# Patient Record
Sex: FEMALE | Race: WHITE | NOT HISPANIC OR LATINO | Employment: FULL TIME | URBAN - METROPOLITAN AREA
[De-identification: names, ages, dates, MRNs, and addresses within clinical notes are randomized per-mention and may not be internally consistent; named-entity substitution may affect disease eponyms.]

---

## 2023-04-03 DIAGNOSIS — Z78.9 USES BIRTH CONTROL: ICD-10-CM

## 2023-04-03 RX ORDER — NORETHINDRONE AND ETHINYL ESTRADIOL 1 MG-35MCG
1 KIT ORAL DAILY
COMMUNITY
Start: 2019-02-03 | End: 2023-04-03 | Stop reason: SDUPTHER

## 2023-04-03 RX ORDER — NORETHINDRONE AND ETHINYL ESTRADIOL 1 MG-35MCG
1 KIT ORAL DAILY
Qty: 28 TABLET | Refills: 4 | Status: SHIPPED | OUTPATIENT
Start: 2023-04-03 | End: 2023-08-07 | Stop reason: SDUPTHER

## 2023-04-18 LAB
RBC, URINE: 1 /HPF (ref 0–5)
SQUAMOUS EPITHELIAL CELLS, URINE: <1 /HPF
WBC, URINE: NORMAL /HPF (ref 0–5)

## 2023-04-19 LAB
CLUE CELLS: NORMAL
NUGENT SCORE: 2
URINE CULTURE: NO GROWTH
YEAST: NORMAL

## 2023-05-03 LAB
BACTERIA, URINE: ABNORMAL /HPF
RBC, URINE: 4 /HPF (ref 0–5)
SQUAMOUS EPITHELIAL CELLS, URINE: 2 /HPF
WBC, URINE: 4 /HPF (ref 0–5)

## 2023-05-04 LAB — URINE CULTURE: NORMAL

## 2023-05-16 ENCOUNTER — OFFICE VISIT (OUTPATIENT)
Dept: PRIMARY CARE | Facility: CLINIC | Age: 28
End: 2023-05-16
Payer: COMMERCIAL

## 2023-05-16 VITALS
SYSTOLIC BLOOD PRESSURE: 100 MMHG | WEIGHT: 155 LBS | OXYGEN SATURATION: 100 % | HEART RATE: 43 BPM | HEIGHT: 62 IN | BODY MASS INDEX: 28.52 KG/M2 | DIASTOLIC BLOOD PRESSURE: 70 MMHG

## 2023-05-16 DIAGNOSIS — N39.0 FREQUENT UTI: ICD-10-CM

## 2023-05-16 DIAGNOSIS — Z00.00 ANNUAL PHYSICAL EXAM: Primary | ICD-10-CM

## 2023-05-16 LAB
ERYTHROCYTE DISTRIBUTION WIDTH (RATIO) BY AUTOMATED COUNT: 13.2 % (ref 11.5–14.5)
ERYTHROCYTE MEAN CORPUSCULAR HEMOGLOBIN CONCENTRATION (G/DL) BY AUTOMATED: 31.1 G/DL (ref 32–36)
ERYTHROCYTE MEAN CORPUSCULAR VOLUME (FL) BY AUTOMATED COUNT: 92 FL (ref 80–100)
ERYTHROCYTES (10*6/UL) IN BLOOD BY AUTOMATED COUNT: 4.88 X10E12/L (ref 4–5.2)
HEMATOCRIT (%) IN BLOOD BY AUTOMATED COUNT: 44.7 % (ref 36–46)
HEMOGLOBIN (G/DL) IN BLOOD: 13.9 G/DL (ref 12–16)
LEUKOCYTES (10*3/UL) IN BLOOD BY AUTOMATED COUNT: 6.2 X10E9/L (ref 4.4–11.3)
NRBC (PER 100 WBCS) BY AUTOMATED COUNT: 0 /100 WBC (ref 0–0)
PLATELETS (10*3/UL) IN BLOOD AUTOMATED COUNT: 437 X10E9/L (ref 150–450)
THYROTROPIN (MIU/L) IN SER/PLAS BY DETECTION LIMIT <= 0.05 MIU/L: 1.81 MIU/L (ref 0.44–3.98)

## 2023-05-16 PROCEDURE — 80053 COMPREHEN METABOLIC PANEL: CPT

## 2023-05-16 PROCEDURE — 99395 PREV VISIT EST AGE 18-39: CPT | Performed by: STUDENT IN AN ORGANIZED HEALTH CARE EDUCATION/TRAINING PROGRAM

## 2023-05-16 PROCEDURE — 36415 COLL VENOUS BLD VENIPUNCTURE: CPT

## 2023-05-16 PROCEDURE — 85027 COMPLETE CBC AUTOMATED: CPT

## 2023-05-16 PROCEDURE — 84443 ASSAY THYROID STIM HORMONE: CPT

## 2023-05-16 PROCEDURE — 1036F TOBACCO NON-USER: CPT | Performed by: STUDENT IN AN ORGANIZED HEALTH CARE EDUCATION/TRAINING PROGRAM

## 2023-05-16 RX ORDER — ASCORBIC ACID 125 MG
TABLET,CHEWABLE ORAL
COMMUNITY
Start: 2023-04-18

## 2023-05-16 RX ORDER — D-MANNOSE 99 %
POWDER (GRAM) MISCELLANEOUS
COMMUNITY
Start: 2023-04-18

## 2023-05-16 ASSESSMENT — ENCOUNTER SYMPTOMS
ARTHRALGIAS: 0
GASTROINTESTINAL NEGATIVE: 1
DYSPHORIC MOOD: 0
SINUS PRESSURE: 0
HEMATOLOGIC/LYMPHATIC NEGATIVE: 1
CARDIOVASCULAR NEGATIVE: 1
FATIGUE: 0
CONSTITUTIONAL NEGATIVE: 1
PALPITATIONS: 0
HEADACHES: 0
CHEST TIGHTNESS: 0
UNEXPECTED WEIGHT CHANGE: 0
MUSCULOSKELETAL NEGATIVE: 1
DIZZINESS: 0
SHORTNESS OF BREATH: 0
DIARRHEA: 0
WHEEZING: 0
NERVOUS/ANXIOUS: 0
NEUROLOGICAL NEGATIVE: 1
ABDOMINAL PAIN: 0
DIFFICULTY URINATING: 0
EYES NEGATIVE: 1
SLEEP DISTURBANCE: 0
RESPIRATORY NEGATIVE: 1
CONSTIPATION: 0
LIGHT-HEADEDNESS: 0

## 2023-05-16 ASSESSMENT — PATIENT HEALTH QUESTIONNAIRE - PHQ9
1. LITTLE INTEREST OR PLEASURE IN DOING THINGS: NOT AT ALL
SUM OF ALL RESPONSES TO PHQ9 QUESTIONS 1 AND 2: 0
2. FEELING DOWN, DEPRESSED OR HOPELESS: NOT AT ALL

## 2023-05-16 NOTE — PROGRESS NOTES
"Subjective   Patient ID: Delores Marshall is a 27 y.o. female who presents for Establish Care (New patient visit est care/).  Here to establish care as a new patient. Has not seen a PCP in several years. Has not had blood work in some time and is requesting to have blood work done during this visit. She has seen a gynecologist a few years ago, is up to date on pap smears. She does suffer from frequent UTI's, about 5-7/year, last one being 1 month ago. She routinely experiences dysuria, frequency, increased pelvic pressure, but no flank pain. She does follow up with a urologist that is currently working up her hx of frequent UTI's. She does have abx prescribed by the urologist as needed.    She denies any major surgeries.  She denies any significant medical hx in her family.    She is physically active, cleans homes for a living and has no issues with her physical activities.        Review of Systems   Constitutional: Negative.    HENT: Negative.     Eyes: Negative.    Respiratory: Negative.     Cardiovascular: Negative.    Gastrointestinal: Negative.    Genitourinary: Negative.    Musculoskeletal: Negative.    Skin: Negative.    Neurological: Negative.    Hematological: Negative.        Objective   /70 (BP Location: Right arm, Patient Position: Sitting, BP Cuff Size: Adult)   Pulse (!) 43   Ht 1.575 m (5' 2\")   Wt 70.3 kg (155 lb)   SpO2 100%   BMI 28.35 kg/m²   Lab Results   Component Value Date    WBC 10.7 10/15/2019    HGB 13.4 10/15/2019    HCT 41.1 10/15/2019    MCV 89 10/15/2019     10/15/2019     Lab Results   Component Value Date    GLUCOSE 68 (L) 10/15/2019    CALCIUM 9.8 10/15/2019     10/15/2019    K 3.8 10/15/2019    CO2 25 10/15/2019     10/15/2019    BUN 10 10/15/2019    CREATININE 0.63 10/15/2019     Lab Results   Component Value Date    TSH 1.30 01/08/2018     No results found for: HGBA1C    Physical Exam  Constitutional:       General: She is not in acute distress.     " Appearance: Normal appearance. She is normal weight. She is not ill-appearing.   HENT:      Head: Normocephalic and atraumatic.   Eyes:      Extraocular Movements: Extraocular movements intact.      Pupils: Pupils are equal, round, and reactive to light.   Cardiovascular:      Rate and Rhythm: Normal rate and regular rhythm.      Pulses: Normal pulses.      Heart sounds: Normal heart sounds. No murmur heard.  Pulmonary:      Effort: Pulmonary effort is normal. No respiratory distress.      Breath sounds: Normal breath sounds. No wheezing.   Abdominal:      General: Bowel sounds are normal. There is no distension.      Palpations: Abdomen is soft.      Tenderness: There is no abdominal tenderness.   Musculoskeletal:      Cervical back: Normal range of motion.   Skin:     General: Skin is warm and dry.   Neurological:      Mental Status: She is alert.         Assessment/Plan   Problem List Items Addressed This Visit    None  Visit Diagnoses       Annual physical exam    -  Primary    Relevant Orders    CBC    Comprehensive metabolic panel    TSH with reflex to Free T4 if abnormal              Patient seen and examined by resident physician,  - Dr. Yair Paulson PGY-3

## 2023-05-16 NOTE — PROGRESS NOTES
Subjective   Patient ID: Delores Marshall is a 27 y.o. female who presents for Establish Care (New patient visit [facility]/).  Establish care. Hx of frequent UTIs. Sees urology. Has had cystoscopy. Taking d mannose, seems to be helping.     Would like screening labs.     Up to date on paps. Up to date on HPV vaccines.     No concerns today.             Review of Systems   Constitutional:  Negative for fatigue and unexpected weight change.   HENT:  Negative for congestion, ear pain and sinus pressure.    Eyes:  Negative for visual disturbance.   Respiratory:  Negative for chest tightness, shortness of breath and wheezing.    Cardiovascular:  Negative for chest pain, palpitations and leg swelling.   Gastrointestinal:  Negative for abdominal pain, constipation and diarrhea.   Genitourinary:  Negative for difficulty urinating and menstrual problem.   Musculoskeletal:  Negative for arthralgias.   Skin:  Negative for rash.   Neurological:  Negative for dizziness, light-headedness and headaches.   Psychiatric/Behavioral:  Negative for dysphoric mood and sleep disturbance. The patient is not nervous/anxious.    All other systems reviewed and are negative.      Objective   Physical Exam  Vitals reviewed.   Constitutional:       General: She is not in acute distress.     Appearance: Normal appearance. She is not toxic-appearing.   HENT:      Head: Normocephalic.      Right Ear: Tympanic membrane, ear canal and external ear normal. There is no impacted cerumen.      Left Ear: Tympanic membrane, ear canal and external ear normal. There is no impacted cerumen.      Nose: Nose normal. No congestion or rhinorrhea.      Mouth/Throat:      Mouth: Mucous membranes are moist.      Pharynx: Oropharynx is clear. No oropharyngeal exudate.   Eyes:      Pupils: Pupils are equal, round, and reactive to light.   Cardiovascular:      Rate and Rhythm: Normal rate and regular rhythm.   Pulmonary:      Effort: Pulmonary effort is normal.       Breath sounds: Normal breath sounds.   Musculoskeletal:         General: Normal range of motion.      Cervical back: Normal range of motion.   Skin:     General: Skin is warm and dry.   Neurological:      General: No focal deficit present.      Mental Status: She is alert. Mental status is at baseline.   Psychiatric:         Mood and Affect: Mood normal.         Behavior: Behavior normal.       Current Outpatient Medications:     Alyacen 1/35, 28, 1-35 mg-mcg tablet, Take 1 tablet by mouth once daily., Disp: 28 tablet, Rfl: 4    ascorbic acid (Vitamin C) 125 mg chewable tablet, Chew., Disp: , Rfl:     d-mannose, bulk, 99 % powder, D-Mannose Powder Refills: 0  Start: 18-Apr-2023, Disp: , Rfl:       Assessment/Plan   Diagnoses and all orders for this visit:  Annual physical exam  Comments:  up to date on pap  up to date on vaccines  Orders:  -     CBC  -     Comprehensive metabolic panel  -     TSH with reflex to Free T4 if abnormal  Frequent UTI  Comments:  taking d mannose  follows with urology    I saw and evaluated the patient. I personally obtained the key and critical portions of the history and physical exam or was physically present for key and critical portions performed by the trainee. I reviewed the trainee's documentation and discussed the patient with the trainee. I agree with the trainee's medical decision making, as documented on the trainee's note.      Alma Rosa Blum, DO

## 2023-05-17 LAB
ALANINE AMINOTRANSFERASE (SGPT) (U/L) IN SER/PLAS: 12 U/L (ref 7–45)
ALBUMIN (G/DL) IN SER/PLAS: 4.6 G/DL (ref 3.4–5)
ALKALINE PHOSPHATASE (U/L) IN SER/PLAS: 46 U/L (ref 33–110)
ANION GAP IN SER/PLAS: 15 MMOL/L (ref 10–20)
ASPARTATE AMINOTRANSFERASE (SGOT) (U/L) IN SER/PLAS: 13 U/L (ref 9–39)
BILIRUBIN TOTAL (MG/DL) IN SER/PLAS: 0.5 MG/DL (ref 0–1.2)
CALCIUM (MG/DL) IN SER/PLAS: 10 MG/DL (ref 8.6–10.6)
CARBON DIOXIDE, TOTAL (MMOL/L) IN SER/PLAS: 29 MMOL/L (ref 21–32)
CHLORIDE (MMOL/L) IN SER/PLAS: 101 MMOL/L (ref 98–107)
CREATININE (MG/DL) IN SER/PLAS: 0.7 MG/DL (ref 0.5–1.05)
GFR FEMALE: >90 ML/MIN/1.73M2
GLUCOSE (MG/DL) IN SER/PLAS: 85 MG/DL (ref 74–99)
POTASSIUM (MMOL/L) IN SER/PLAS: 5 MMOL/L (ref 3.5–5.3)
PROTEIN TOTAL: 7.3 G/DL (ref 6.4–8.2)
SODIUM (MMOL/L) IN SER/PLAS: 140 MMOL/L (ref 136–145)
UREA NITROGEN (MG/DL) IN SER/PLAS: 10 MG/DL (ref 6–23)

## 2023-08-07 DIAGNOSIS — Z78.9 USES BIRTH CONTROL: ICD-10-CM

## 2023-08-07 RX ORDER — NORETHINDRONE AND ETHINYL ESTRADIOL 1 MG-35MCG
1 KIT ORAL DAILY
Qty: 28 TABLET | Refills: 4 | Status: SHIPPED | OUTPATIENT
Start: 2023-08-07 | End: 2023-11-17 | Stop reason: SDUPTHER

## 2023-11-17 DIAGNOSIS — Z78.9 USES BIRTH CONTROL: ICD-10-CM

## 2023-11-17 RX ORDER — NORETHINDRONE AND ETHINYL ESTRADIOL 1 MG-35MCG
1 KIT ORAL DAILY
Qty: 28 TABLET | Refills: 4 | Status: SHIPPED | OUTPATIENT
Start: 2023-11-17 | End: 2024-03-23

## 2024-01-19 ENCOUNTER — TELEMEDICINE (OUTPATIENT)
Dept: UROLOGY | Facility: CLINIC | Age: 29
End: 2024-01-19
Payer: COMMERCIAL

## 2024-01-19 DIAGNOSIS — N39.0 RECURRENT UTI: Primary | ICD-10-CM

## 2024-01-19 PROCEDURE — 99213 OFFICE O/P EST LOW 20 MIN: CPT | Performed by: NURSE PRACTITIONER

## 2024-01-19 NOTE — PROGRESS NOTES
01/19/24   [id number]    Follow up recurrent UTIs     Subjective      HPI Delores Marshall is a 28 y.o. female who presents for follow up recurrent UTIs.     Dmanose gummies, happy w abx suppression w intimacy some of the time, doesn't need all the time; doesn't need refill right now; No UTIs since last seen in the summer; overall, 80% better, and not a 100% since she is still taking the suppression therapy some of the time;       Objective     There were no vitals taken for this visit.   Physical Exam  General: Appears comfortable and in no apparent distress, well nourished  Head: Normocephalic, atraumatic  Neck: trachea midline  Respiratory: respirations unlabored, no wheezes, and no use of accessory muscles  Cardiovascular: at rest no dyspnea, well perfused  Skin: no visible rashes or lesions  Neurologic: grossly intact, oriented to person, place, and time  Psychiatric: mood and affect appropriate  Musculoskeletal: in chair for appt. no difficulty w upper body movement      Assessment/Plan   Problem List Items Addressed This Visit    None  Visit Diagnoses       Recurrent UTI    -  Primary          No orders of the defined types were placed in this encounter.       Plan:   continue Macrodantin 50 mg w intercourse PRN  Plans to wean off if she can, otherwise will call for refill when needed  f/u 6 mos     call Shyanne at [phone] sooner if any concerns on nonemergency nurse line       JOHANNA Mejia-CNP  Lab Results   Component Value Date    GLUCOSE 85 05/16/2023    CALCIUM 10.0 05/16/2023     05/16/2023    K 5.0 05/16/2023    CO2 29 05/16/2023     05/16/2023    BUN 10 05/16/2023    CREATININE 0.70 05/16/2023

## 2024-01-19 NOTE — PATIENT INSTRUCTIONS
Plan:   continue Macrodantin 50 mg w intercourse PRN  Plans to wean off if she can, otherwise will call for refill when needed  f/u 6 mos     call Shyanne at [phone] sooner if any concerns on nonemergency nurse line

## 2024-03-23 DIAGNOSIS — Z78.9 USES BIRTH CONTROL: ICD-10-CM

## 2024-03-23 RX ORDER — NORETHINDRONE AND ETHINYL ESTRADIOL 1 MG-35MCG
1 KIT ORAL DAILY
Qty: 28 TABLET | Refills: 4 | Status: SHIPPED | OUTPATIENT
Start: 2024-03-23 | End: 2024-04-10 | Stop reason: SDUPTHER

## 2024-04-10 ENCOUNTER — OFFICE VISIT (OUTPATIENT)
Dept: PRIMARY CARE | Facility: CLINIC | Age: 29
End: 2024-04-10
Payer: COMMERCIAL

## 2024-04-10 VITALS
SYSTOLIC BLOOD PRESSURE: 120 MMHG | HEART RATE: 88 BPM | BODY MASS INDEX: 29.63 KG/M2 | HEIGHT: 62 IN | WEIGHT: 161 LBS | OXYGEN SATURATION: 100 % | DIASTOLIC BLOOD PRESSURE: 80 MMHG

## 2024-04-10 DIAGNOSIS — Z78.9 USES BIRTH CONTROL: ICD-10-CM

## 2024-04-10 DIAGNOSIS — R51.9 NONINTRACTABLE EPISODIC HEADACHE, UNSPECIFIED HEADACHE TYPE: ICD-10-CM

## 2024-04-10 DIAGNOSIS — K21.9 GERD WITHOUT ESOPHAGITIS: Primary | ICD-10-CM

## 2024-04-10 PROCEDURE — 99214 OFFICE O/P EST MOD 30 MIN: CPT | Performed by: STUDENT IN AN ORGANIZED HEALTH CARE EDUCATION/TRAINING PROGRAM

## 2024-04-10 PROCEDURE — 1036F TOBACCO NON-USER: CPT | Performed by: STUDENT IN AN ORGANIZED HEALTH CARE EDUCATION/TRAINING PROGRAM

## 2024-04-10 RX ORDER — NORETHINDRONE AND ETHINYL ESTRADIOL 1 MG-35MCG
1 KIT ORAL DAILY
Qty: 28 TABLET | Refills: 12 | Status: SHIPPED | OUTPATIENT
Start: 2024-04-10

## 2024-04-10 RX ORDER — OMEPRAZOLE 40 MG/1
40 CAPSULE, DELAYED RELEASE ORAL
Qty: 30 CAPSULE | Refills: 0 | Status: SHIPPED | OUTPATIENT
Start: 2024-04-10 | End: 2024-06-06

## 2024-04-10 ASSESSMENT — ENCOUNTER SYMPTOMS
ABDOMINAL PAIN: 1
DIZZINESS: 0
ACTIVITY CHANGE: 0
DYSPHORIC MOOD: 0
SLEEP DISTURBANCE: 0
SHORTNESS OF BREATH: 0
CHEST TIGHTNESS: 0
FATIGUE: 0
VOMITING: 0
HEADACHES: 1
MUSCULOSKELETAL NEGATIVE: 1
NAUSEA: 0

## 2024-04-10 NOTE — PROGRESS NOTES
Subjective   Patient ID: Delores Marshall is a 28 y.o. female who presents for Headache (Headaches - was having them pretty frequently but they have subsided since she scheduled the apt . Takes tylenol for the pain. Would not classify as migraines. No light sensitivity or nausea etc. /Heartburn - takes otc alkasletzer /).  Headaches in front of forehead, pressure. Denies dizziness, photophobia, or phonophobia. These started about 4 weeks ago. Tylenol helped. Was getting them every day. Had a recent eye exam. Is wearing her glasses now, was not prior.     Since wearing her glasses she has been doing better.    No seasonal allergies or nasal congestion.     Drinks a lot of water.     Sleeping well.     Frequent heartburn. Happening right after eating. Last few months. Taking elva seltzer chewables. No nausea or vomiting.     Also says sometimes she will become fixated on her heart rate. Can last for up to a few minutes. Usually tries to walk around or distract herself.     No other concerns today.           Review of Systems   Constitutional:  Negative for activity change and fatigue.   HENT: Negative.     Respiratory:  Negative for chest tightness and shortness of breath.    Cardiovascular:  Negative for chest pain.   Gastrointestinal:  Positive for abdominal pain. Negative for nausea and vomiting.   Musculoskeletal: Negative.    Neurological:  Positive for headaches. Negative for dizziness.   Psychiatric/Behavioral:  Negative for dysphoric mood and sleep disturbance.    All other systems reviewed and are negative.      Objective   Physical Exam  Vitals reviewed.   Constitutional:       General: She is not in acute distress.     Appearance: Normal appearance.   HENT:      Head: Normocephalic.      Right Ear: There is no impacted cerumen.      Left Ear: There is no impacted cerumen.      Nose: Nose normal. No congestion.      Mouth/Throat:      Mouth: Mucous membranes are moist.   Eyes:      Pupils: Pupils are equal,  round, and reactive to light.   Pulmonary:      Effort: Pulmonary effort is normal. No respiratory distress.   Musculoskeletal:         General: Normal range of motion.   Skin:     General: Skin is warm and dry.   Neurological:      Mental Status: She is alert. Mental status is at baseline.   Psychiatric:         Mood and Affect: Mood normal.         Behavior: Behavior normal.         Body mass index is 29.45 kg/m².      Current Outpatient Medications:     ascorbic acid (Vitamin C) 125 mg chewable tablet, Chew., Disp: , Rfl:     d-mannose, bulk, 99 % powder, D-Mannose Powder Refills: 0  Start: 18-Apr-2023, Disp: , Rfl:     Alyacen 1/35, 28, 1-35 mg-mcg tablet, Take 1 tablet by mouth once daily., Disp: 28 tablet, Rfl: 12    omeprazole (PriLOSEC) 40 mg DR capsule, Take 1 capsule (40 mg) by mouth once daily in the morning. Take before meals. Do not crush or chew., Disp: 30 capsule, Rfl: 0      Assessment/Plan   Diagnoses and all orders for this visit:  GERD without esophagitis  Comments:  persistent symptoms  one month PPI trial  follow up for EGD if not improving  Orders:  -     omeprazole (PriLOSEC) 40 mg DR capsule; Take 1 capsule (40 mg) by mouth once daily in the morning. Take before meals. Do not crush or chew.  Uses birth control  -     Alyacen 1/35, 28, 1-35 mg-mcg tablet; Take 1 tablet by mouth once daily.  Nonintractable episodic headache, unspecified headache type  Comments:  now that she is wearing her glasses headaches have resolved    Follow up for NANCY Blum DO 04/10/24 1:43 PM

## 2024-04-19 ENCOUNTER — APPOINTMENT (OUTPATIENT)
Dept: PRIMARY CARE | Facility: CLINIC | Age: 29
End: 2024-04-19
Payer: COMMERCIAL

## 2024-06-03 ENCOUNTER — APPOINTMENT (OUTPATIENT)
Dept: PRIMARY CARE | Facility: CLINIC | Age: 29
End: 2024-06-03
Payer: COMMERCIAL

## 2024-06-06 ENCOUNTER — OFFICE VISIT (OUTPATIENT)
Dept: PRIMARY CARE | Facility: CLINIC | Age: 29
End: 2024-06-06
Payer: COMMERCIAL

## 2024-06-06 VITALS
WEIGHT: 158 LBS | SYSTOLIC BLOOD PRESSURE: 98 MMHG | BODY MASS INDEX: 29.08 KG/M2 | OXYGEN SATURATION: 97 % | DIASTOLIC BLOOD PRESSURE: 70 MMHG | HEART RATE: 73 BPM | HEIGHT: 62 IN

## 2024-06-06 DIAGNOSIS — Z13.220 LIPID SCREENING: ICD-10-CM

## 2024-06-06 DIAGNOSIS — Z00.00 ANNUAL PHYSICAL EXAM: Primary | ICD-10-CM

## 2024-06-06 DIAGNOSIS — Z13.29 THYROID DISORDER SCREENING: ICD-10-CM

## 2024-06-06 DIAGNOSIS — K21.9 GERD WITHOUT ESOPHAGITIS: ICD-10-CM

## 2024-06-06 LAB
ALBUMIN SERPL BCP-MCNC: 4.5 G/DL (ref 3.4–5)
ALP SERPL-CCNC: 49 U/L (ref 33–110)
ALT SERPL W P-5'-P-CCNC: 11 U/L (ref 7–45)
ANION GAP SERPL CALC-SCNC: 16 MMOL/L (ref 10–20)
AST SERPL W P-5'-P-CCNC: 12 U/L (ref 9–39)
BILIRUB SERPL-MCNC: 0.5 MG/DL (ref 0–1.2)
BUN SERPL-MCNC: 11 MG/DL (ref 6–23)
CALCIUM SERPL-MCNC: 9.4 MG/DL (ref 8.6–10.6)
CHLORIDE SERPL-SCNC: 100 MMOL/L (ref 98–107)
CHOLEST SERPL-MCNC: 251 MG/DL (ref 0–199)
CHOLESTEROL/HDL RATIO: 3.8
CO2 SERPL-SCNC: 26 MMOL/L (ref 21–32)
CREAT SERPL-MCNC: 0.61 MG/DL (ref 0.5–1.05)
EGFRCR SERPLBLD CKD-EPI 2021: >90 ML/MIN/1.73M*2
ERYTHROCYTE [DISTWIDTH] IN BLOOD BY AUTOMATED COUNT: 13.5 % (ref 11.5–14.5)
EST. AVERAGE GLUCOSE BLD GHB EST-MCNC: 94 MG/DL
GLUCOSE SERPL-MCNC: 80 MG/DL (ref 74–99)
HBA1C MFR BLD: 4.9 %
HCT VFR BLD AUTO: 43.6 % (ref 36–46)
HDLC SERPL-MCNC: 66.1 MG/DL
HGB BLD-MCNC: 13.8 G/DL (ref 12–16)
LDLC SERPL CALC-MCNC: 146 MG/DL
MCH RBC QN AUTO: 28.8 PG (ref 26–34)
MCHC RBC AUTO-ENTMCNC: 31.7 G/DL (ref 32–36)
MCV RBC AUTO: 91 FL (ref 80–100)
NON HDL CHOLESTEROL: 185 MG/DL (ref 0–149)
NRBC BLD-RTO: 0 /100 WBCS (ref 0–0)
PLATELET # BLD AUTO: 430 X10*3/UL (ref 150–450)
POTASSIUM SERPL-SCNC: 4.7 MMOL/L (ref 3.5–5.3)
PROT SERPL-MCNC: 7 G/DL (ref 6.4–8.2)
RBC # BLD AUTO: 4.8 X10*6/UL (ref 4–5.2)
SODIUM SERPL-SCNC: 137 MMOL/L (ref 136–145)
TRIGL SERPL-MCNC: 197 MG/DL (ref 0–149)
TSH SERPL-ACNC: 1.82 MIU/L (ref 0.44–3.98)
VLDL: 39 MG/DL (ref 0–40)
WBC # BLD AUTO: 7.7 X10*3/UL (ref 4.4–11.3)

## 2024-06-06 PROCEDURE — 99395 PREV VISIT EST AGE 18-39: CPT | Performed by: STUDENT IN AN ORGANIZED HEALTH CARE EDUCATION/TRAINING PROGRAM

## 2024-06-06 PROCEDURE — 80053 COMPREHEN METABOLIC PANEL: CPT

## 2024-06-06 PROCEDURE — 83036 HEMOGLOBIN GLYCOSYLATED A1C: CPT

## 2024-06-06 PROCEDURE — 80061 LIPID PANEL: CPT

## 2024-06-06 PROCEDURE — 84443 ASSAY THYROID STIM HORMONE: CPT

## 2024-06-06 PROCEDURE — 36415 COLL VENOUS BLD VENIPUNCTURE: CPT

## 2024-06-06 PROCEDURE — 1036F TOBACCO NON-USER: CPT | Performed by: STUDENT IN AN ORGANIZED HEALTH CARE EDUCATION/TRAINING PROGRAM

## 2024-06-06 PROCEDURE — 85027 COMPLETE CBC AUTOMATED: CPT

## 2024-06-06 ASSESSMENT — ENCOUNTER SYMPTOMS
SINUS PRESSURE: 0
DIZZINESS: 0
DYSPHORIC MOOD: 0
DIFFICULTY URINATING: 0
HEADACHES: 0
CHEST TIGHTNESS: 0
NERVOUS/ANXIOUS: 0
UNEXPECTED WEIGHT CHANGE: 0
ABDOMINAL PAIN: 0
SLEEP DISTURBANCE: 0
CONSTIPATION: 0
PALPITATIONS: 0
SHORTNESS OF BREATH: 0
FATIGUE: 0
DIARRHEA: 0
WHEEZING: 0
LIGHT-HEADEDNESS: 0

## 2024-06-06 ASSESSMENT — PROMIS GLOBAL HEALTH SCALE
RATE_SOCIAL_SATISFACTION: GOOD
CARRYOUT_SOCIAL_ACTIVITIES: VERY GOOD
RATE_GENERAL_HEALTH: GOOD
RATE_AVERAGE_FATIGUE: MILD
RATE_AVERAGE_PAIN: 0
RATE_PHYSICAL_HEALTH: GOOD
RATE_MENTAL_HEALTH: GOOD
CARRYOUT_PHYSICAL_ACTIVITIES: MOSTLY
EMOTIONAL_PROBLEMS: SOMETIMES
RATE_QUALITY_OF_LIFE: GOOD

## 2024-06-06 ASSESSMENT — PATIENT HEALTH QUESTIONNAIRE - PHQ9
SUM OF ALL RESPONSES TO PHQ9 QUESTIONS 1 AND 2: 0
2. FEELING DOWN, DEPRESSED OR HOPELESS: NOT AT ALL
1. LITTLE INTEREST OR PLEASURE IN DOING THINGS: NOT AT ALL

## 2024-06-06 NOTE — PROGRESS NOTES
Subjective   Patient ID: Delores Marshall is a 28 y.o. female who presents for Annual Exam (Annual physical and bloodwork).  Thinks she is UTD on paps. Periods regular.     Gets regular physical exercise.     GERD has resolved after 1 month PPI.     No sleep issues.     No problems with digestion.     Due for labs.     No other concerns today.         Review of Systems   Constitutional:  Negative for fatigue and unexpected weight change.   HENT:  Negative for congestion, ear pain and sinus pressure.    Eyes:  Negative for visual disturbance.   Respiratory:  Negative for chest tightness, shortness of breath and wheezing.    Cardiovascular:  Negative for chest pain, palpitations and leg swelling.   Gastrointestinal:  Negative for abdominal pain, constipation and diarrhea.   Genitourinary:  Negative for difficulty urinating.   Skin:  Negative for rash.   Neurological:  Negative for dizziness, light-headedness and headaches.   Psychiatric/Behavioral:  Negative for dysphoric mood and sleep disturbance. The patient is not nervous/anxious.    All other systems reviewed and are negative.      Objective   Physical Exam  Vitals reviewed.   Constitutional:       General: She is not in acute distress.  HENT:      Head: Normocephalic.      Right Ear: External ear normal.      Left Ear: External ear normal.      Nose: Nose normal.   Eyes:      Extraocular Movements: Extraocular movements intact.      Pupils: Pupils are equal, round, and reactive to light.   Cardiovascular:      Rate and Rhythm: Normal rate and regular rhythm.      Heart sounds: Normal heart sounds.   Pulmonary:      Effort: Pulmonary effort is normal.      Breath sounds: Normal breath sounds.   Abdominal:      Palpations: Abdomen is soft.      Tenderness: There is no abdominal tenderness. There is no guarding.   Musculoskeletal:         General: Normal range of motion.      Cervical back: Normal range of motion and neck supple.   Skin:     General: Skin is warm  and dry.   Neurological:      Mental Status: She is alert. Mental status is at baseline.   Psychiatric:         Mood and Affect: Mood normal.         Behavior: Behavior normal.         Body mass index is 28.9 kg/m².      Current Outpatient Medications:     Alyacen 1/35, 28, 1-35 mg-mcg tablet, Take 1 tablet by mouth once daily., Disp: 28 tablet, Rfl: 12    ascorbic acid (Vitamin C) 125 mg chewable tablet, Chew., Disp: , Rfl:     d-mannose, bulk, 99 % powder, D-Mannose Powder Refills: 0  Start: 18-Apr-2023, Disp: , Rfl:     omeprazole (PriLOSEC) 40 mg DR capsule, Take 1 capsule (40 mg) by mouth once daily in the morning. Take before meals. Do not crush or chew., Disp: 30 capsule, Rfl: 0      Assessment/Plan   Diagnoses and all orders for this visit:  Annual physical exam  Comments:  reports UTD on pap  screening labs ordered  Orders:  -     Comprehensive Metabolic Panel  -     CBC  -     Hemoglobin A1c  GERD without esophagitis  Comments:  symptoms resolved after 1 month PPI  instructed her to call if symptoms recur  Lipid screening  -     Lipid Panel  Thyroid disorder screening  -     TSH with reflex to Free T4 if abnormal    Follow up annually       Alma Rosa Blum DO 06/06/24 9:29 AM

## 2024-07-19 ENCOUNTER — OFFICE VISIT (OUTPATIENT)
Dept: PRIMARY CARE | Facility: CLINIC | Age: 29
End: 2024-07-19
Payer: COMMERCIAL

## 2024-07-19 VITALS
HEIGHT: 62 IN | HEART RATE: 90 BPM | BODY MASS INDEX: 28.34 KG/M2 | WEIGHT: 154 LBS | DIASTOLIC BLOOD PRESSURE: 80 MMHG | SYSTOLIC BLOOD PRESSURE: 120 MMHG | OXYGEN SATURATION: 98 %

## 2024-07-19 DIAGNOSIS — K21.9 GERD WITHOUT ESOPHAGITIS: Primary | Chronic | ICD-10-CM

## 2024-07-19 DIAGNOSIS — R51.9 NONINTRACTABLE EPISODIC HEADACHE, UNSPECIFIED HEADACHE TYPE: ICD-10-CM

## 2024-07-19 PROCEDURE — 3008F BODY MASS INDEX DOCD: CPT | Performed by: STUDENT IN AN ORGANIZED HEALTH CARE EDUCATION/TRAINING PROGRAM

## 2024-07-19 PROCEDURE — 1036F TOBACCO NON-USER: CPT | Performed by: STUDENT IN AN ORGANIZED HEALTH CARE EDUCATION/TRAINING PROGRAM

## 2024-07-19 PROCEDURE — 99214 OFFICE O/P EST MOD 30 MIN: CPT | Performed by: STUDENT IN AN ORGANIZED HEALTH CARE EDUCATION/TRAINING PROGRAM

## 2024-07-19 RX ORDER — PANTOPRAZOLE SODIUM 40 MG/1
40 TABLET, DELAYED RELEASE ORAL DAILY
Qty: 30 TABLET | Refills: 2 | Status: SHIPPED | OUTPATIENT
Start: 2024-07-19 | End: 2024-10-17

## 2024-07-19 ASSESSMENT — ENCOUNTER SYMPTOMS
PSYCHIATRIC NEGATIVE: 1
RESPIRATORY NEGATIVE: 1
HEADACHES: 1
CARDIOVASCULAR NEGATIVE: 1
ABDOMINAL PAIN: 1
CONSTITUTIONAL NEGATIVE: 1
VOMITING: 0

## 2024-07-19 NOTE — PROGRESS NOTES
Subjective   Patient ID: Delores Marshall is a 29 y.o. female who presents for Heartburn (Having gas and what she thinks is heartburn - has tried otc prilosec and she thinks it helps a little. She is unsure if its heartburn or something else. When she lays on her left side she says her heart beat feels strange).  Feeling reflux symptoms. Noticing excess belching. Primarily in the evening when she lays down. No nausea or vomiting.     Did a 30 day course of omeprazole 2 months ago which resolved her symptoms short term. Stopped taking this and her symptoms have recurred.     Normal appetite.     Pain in the back of her head on the right when she brushes her teeth on the left. Last 2-3 weeks. This is the only thing that triggers it. No change in intensity. Does not clench her teeth. Rare sinus issues. Has never had this before. Up to date on dental exams.     No other concerns today.             Review of Systems   Constitutional: Negative.    Respiratory: Negative.     Cardiovascular: Negative.    Gastrointestinal:  Positive for abdominal pain. Negative for vomiting.   Neurological:  Positive for headaches.   Psychiatric/Behavioral: Negative.     All other systems reviewed and are negative.      Objective   Physical Exam  Vitals reviewed.   Constitutional:       Appearance: Normal appearance.   Eyes:      Pupils: Pupils are equal, round, and reactive to light.   Abdominal:      Palpations: Abdomen is soft.      Tenderness: There is no abdominal tenderness.   Musculoskeletal:         General: Normal range of motion.   Skin:     General: Skin is warm and dry.   Neurological:      General: No focal deficit present.      Mental Status: She is alert. Mental status is at baseline.   Psychiatric:         Mood and Affect: Mood normal.         Behavior: Behavior normal.         Body mass index is 28.17 kg/m².      Current Outpatient Medications:     Alyacen 1/35, 28, 1-35 mg-mcg tablet, Take 1 tablet by mouth once daily.,  Disp: 28 tablet, Rfl: 12    ascorbic acid (Vitamin C) 125 mg chewable tablet, Chew., Disp: , Rfl:     d-mannose, bulk, 99 % powder, D-Mannose Powder Refills: 0  Start: 18-Apr-2023, Disp: , Rfl:     omeprazole (PriLOSEC) 40 mg DR capsule, Take 1 capsule (40 mg) by mouth once daily in the morning. Take before meals. Do not crush or chew., Disp: 30 capsule, Rfl: 0    pantoprazole (ProtoNix) 40 mg EC tablet, Take 1 tablet (40 mg) by mouth once daily. Do not crush, chew, or split., Disp: 30 tablet, Rfl: 2      Assessment/Plan   Diagnoses and all orders for this visit:  GERD without esophagitis  Comments:  start protonix daily  referral to GI due to recurrence off PPI  Orders:  -     pantoprazole (ProtoNix) 40 mg EC tablet; Take 1 tablet (40 mg) by mouth once daily. Do not crush, chew, or split.  -     Referral to Gastroenterology; Future  Nonintractable episodic headache, unspecified headache type  Comments:  uncertain etiology  recommended discussing with dentist due to mouth origin  call if not improving       Follow up as needed    Alma Rosa Blum DO 07/19/24 2:57 PM

## 2024-08-25 DIAGNOSIS — Z78.9 USES BIRTH CONTROL: ICD-10-CM

## 2024-08-26 RX ORDER — NORETHINDRONE AND ETHINYL ESTRADIOL 1 MG-35MCG
1 KIT ORAL DAILY
Qty: 28 TABLET | Refills: 4 | Status: SHIPPED | OUTPATIENT
Start: 2024-08-26

## 2024-08-28 ENCOUNTER — PATIENT MESSAGE (OUTPATIENT)
Dept: PRIMARY CARE | Facility: CLINIC | Age: 29
End: 2024-08-28
Payer: COMMERCIAL

## 2024-08-28 DIAGNOSIS — G62.9 NEUROPATHY: ICD-10-CM

## 2024-09-18 DIAGNOSIS — K21.9 GERD WITHOUT ESOPHAGITIS: Chronic | ICD-10-CM

## 2024-09-18 RX ORDER — PANTOPRAZOLE SODIUM 40 MG/1
40 TABLET, DELAYED RELEASE ORAL DAILY
Qty: 30 TABLET | Refills: 2 | Status: SHIPPED | OUTPATIENT
Start: 2024-09-18 | End: 2024-12-17

## 2024-10-14 ENCOUNTER — APPOINTMENT (OUTPATIENT)
Dept: GASTROENTEROLOGY | Facility: CLINIC | Age: 29
End: 2024-10-14
Payer: COMMERCIAL

## 2024-12-12 DIAGNOSIS — K21.9 GERD WITHOUT ESOPHAGITIS: Chronic | ICD-10-CM

## 2024-12-12 RX ORDER — PANTOPRAZOLE SODIUM 40 MG/1
40 TABLET, DELAYED RELEASE ORAL DAILY
Qty: 30 TABLET | Refills: 2 | Status: SHIPPED | OUTPATIENT
Start: 2024-12-12

## 2025-01-24 ENCOUNTER — APPOINTMENT (OUTPATIENT)
Dept: UROLOGY | Facility: CLINIC | Age: 30
End: 2025-01-24
Payer: COMMERCIAL

## 2025-02-06 ENCOUNTER — OFFICE VISIT (OUTPATIENT)
Dept: NEUROLOGY | Facility: CLINIC | Age: 30
End: 2025-02-06
Payer: COMMERCIAL

## 2025-02-06 VITALS
SYSTOLIC BLOOD PRESSURE: 108 MMHG | BODY MASS INDEX: 30.6 KG/M2 | HEART RATE: 84 BPM | WEIGHT: 166.3 LBS | HEIGHT: 62 IN | DIASTOLIC BLOOD PRESSURE: 62 MMHG

## 2025-02-06 DIAGNOSIS — G43.009 MIGRAINE WITHOUT AURA AND WITHOUT STATUS MIGRAINOSUS, NOT INTRACTABLE: Primary | ICD-10-CM

## 2025-02-06 DIAGNOSIS — G62.9 NEUROPATHY: ICD-10-CM

## 2025-02-06 PROCEDURE — 99204 OFFICE O/P NEW MOD 45 MIN: CPT | Performed by: NURSE PRACTITIONER

## 2025-02-06 PROCEDURE — 1036F TOBACCO NON-USER: CPT | Performed by: NURSE PRACTITIONER

## 2025-02-06 PROCEDURE — 99214 OFFICE O/P EST MOD 30 MIN: CPT | Performed by: NURSE PRACTITIONER

## 2025-02-06 PROCEDURE — 3008F BODY MASS INDEX DOCD: CPT | Performed by: NURSE PRACTITIONER

## 2025-02-06 RX ORDER — AMITRIPTYLINE HYDROCHLORIDE 10 MG/1
10 TABLET, FILM COATED ORAL NIGHTLY
Qty: 30 TABLET | Refills: 1 | Status: SHIPPED | OUTPATIENT
Start: 2025-02-06

## 2025-02-06 ASSESSMENT — PATIENT HEALTH QUESTIONNAIRE - PHQ9
SUM OF ALL RESPONSES TO PHQ9 QUESTIONS 1 AND 2: 0
1. LITTLE INTEREST OR PLEASURE IN DOING THINGS: NOT AT ALL
2. FEELING DOWN, DEPRESSED OR HOPELESS: NOT AT ALL

## 2025-02-06 ASSESSMENT — ENCOUNTER SYMPTOMS
DEPRESSION: 0
OCCASIONAL FEELINGS OF UNSTEADINESS: 0
LOSS OF SENSATION IN FEET: 0

## 2025-02-06 NOTE — ASSESSMENT & PLAN NOTE
Orders:    amitriptyline (Elavil) 10 mg tablet; Take 1 tablet (10 mg) by mouth once daily at bedtime.

## 2025-02-06 NOTE — PROGRESS NOTES
Chief Complaint   Patient presents with    Migraine       Subjective   HPI  Delores Marshall is a 29 y.o. year old female who presents with chief complaint Migraine without aura and without status migrainosus, not intractable [G43.009]    Delores started getting headaches 1 year ago.    Currently experiencing 12-15 HA days per month.   The headaches are usually  moderate to severe, throbbing, and pressure  and are located across forehead, generally symmetric.   The patient rates the most severe headaches a 7 in intensity.   Generally, headaches last about  24-4/8  hours in duration.   Associated photophobia.   Recent labs? Reviewed and unremarkable  Endorses regular vision checkups.  Endorses regular dental checkups.  The patient denies the presence of any associated double vision, speech problems, confusion, facial or extremity weakness or numbness or problems with coordination. Denies other neurological history of seizures, stroke, or TIA.    Medications  Current & Past Treatments:  Preventive:    Rescue:  Acetaminophen      Current Outpatient Medications:     pantoprazole (ProtoNix) 40 mg EC tablet, TAKE 1 TABLET BY MOUTH ONCE DAILY. do not crush, chew or split., Disp: 30 tablet, Rfl: 2    Alyacen 1/35, 28, 1-35 mg-mcg tablet, Take 1 tablet by mouth once daily. (Patient not taking: Reported on 2/6/2025), Disp: 28 tablet, Rfl: 4    ascorbic acid (Vitamin C) 125 mg chewable tablet, Chew. (Patient not taking: Reported on 2/6/2025), Disp: , Rfl:     d-mannose, bulk, 99 % powder, D-Mannose Powder Refills: 0  Start: 18-Apr-2023 (Patient not taking: Reported on 2/6/2025), Disp: , Rfl:     omeprazole (PriLOSEC) 40 mg DR capsule, Take 1 capsule (40 mg) by mouth once daily in the morning. Take before meals. Do not crush or chew., Disp: 30 capsule, Rfl: 0    Allergies   Allergen Reactions    Amoxicillin Unknown    Penicillins Unknown       Past Medical History:   Diagnosis Date    Other specified health status     No  "pertinent past medical history    Personal history of other diseases of the female genital tract 01/08/2018    History of irregular menstrual cycles     No past surgical history on file.  No family history on file.  Social History     Tobacco Use    Smoking status: Never    Smokeless tobacco: Never   Substance Use Topics    Alcohol use: Not on file     Social History     Substance and Sexual Activity   Drug Use Not on file        ROS  As noted in HPI, otherwise all other systems have been reviewed are negative for complaint.       Objective   General Appearance: Delores is well-developed, well-nourished, 29 y.o. year old female, in no acute distress. Makes good eye contact, is alert, interactive, and cooperative. Demonstrates recent & remote memory recall. Subjective information consistent with objective assessment.     Vitals:    02/06/25 0832   BP: 108/62   Pulse: 84   Weight: 75.4 kg (166 lb 4.8 oz)   Height: 1.575 m (5' 2\")       Lab Results   Component Value Date    WBC 7.7 06/06/2024    RBC 4.80 06/06/2024    HGB 13.8 06/06/2024    HCT 43.6 06/06/2024     06/06/2024     06/06/2024    K 4.7 06/06/2024     06/06/2024    BUN 11 06/06/2024    CREATININE 0.61 06/06/2024    EGFR >90 06/06/2024    CALCIUM 9.4 06/06/2024    ALKPHOS 49 06/06/2024    AST 12 06/06/2024    ALT 11 06/06/2024    HGBA1C 4.9 06/06/2024    LDLCALC 146 (H) 06/06/2024    CHOL 251 (H) 06/06/2024    HDL 66.1 06/06/2024    TRIG 197 (H) 06/06/2024    TSH 1.82 06/06/2024        MENTAL STATUS:  Patient Health Questionnaire-2 Score: 0     HEADACHE EXAM:  No tenderness to palpation during both active and passive ROM testing in the cervicogenic region  No tenderness to palpation in bilateral occipital notches  No tenderness to palpation in bilateral temples  No tenderness to palpation during TMJ testing      Eye Exam  OPHTHALMOSCOPIC:   The ophthalmoscopic exam was normal. The fundi were well visualized with normal disc margins, clear " vessels and vascular pulsations. No disc edema. The cup/disk ratio was not enlarged. No hemorrhages or exudates were present in the posterior segments that were visualized.       GAIT:   Station stable with a normal base. Gait stable with a normal arm swing and speed. No ataxia, shuffling, steppage or waddling present. No circumduction was present.   No Romberg sign present.    RECORDS REVIEW:  Previous records (provider notes, laboratory reports, and imaging studies were reviewed and summarized).       Assessment & Plan  Neuropathy    Orders:    Referral to Neurology    Migraine without aura and without status migrainosus, not intractable    Orders:    amitriptyline (Elavil) 10 mg tablet; Take 1 tablet (10 mg) by mouth once daily at bedtime.         ASSESSMENT/PLAN:  Start amitriptyline for preventative treatment.  Follow-up in 8 weeks or sooner if needed.        Nuha Manjarrez, APRN-CNP

## 2025-02-09 DIAGNOSIS — Z78.9 USES BIRTH CONTROL: ICD-10-CM

## 2025-02-10 RX ORDER — NORETHINDRONE AND ETHINYL ESTRADIOL 1 MG-35MCG
1 KIT ORAL DAILY
Qty: 28 TABLET | Refills: 1 | Status: SHIPPED | OUTPATIENT
Start: 2025-02-10

## 2025-03-14 DIAGNOSIS — R30.0 DYSURIA: Primary | ICD-10-CM

## 2025-04-07 ENCOUNTER — APPOINTMENT (OUTPATIENT)
Dept: NEUROLOGY | Facility: CLINIC | Age: 30
End: 2025-04-07
Payer: COMMERCIAL

## 2025-05-07 ENCOUNTER — APPOINTMENT (OUTPATIENT)
Dept: PRIMARY CARE | Facility: CLINIC | Age: 30
End: 2025-05-07
Payer: COMMERCIAL

## 2025-05-07 VITALS
WEIGHT: 156 LBS | HEART RATE: 68 BPM | DIASTOLIC BLOOD PRESSURE: 68 MMHG | HEIGHT: 62 IN | SYSTOLIC BLOOD PRESSURE: 110 MMHG | BODY MASS INDEX: 28.71 KG/M2 | OXYGEN SATURATION: 100 %

## 2025-05-07 DIAGNOSIS — Z00.00 ANNUAL PHYSICAL EXAM: Primary | ICD-10-CM

## 2025-05-07 DIAGNOSIS — Z13.220 LIPID SCREENING: ICD-10-CM

## 2025-05-07 DIAGNOSIS — M54.50 LEFT-SIDED LOW BACK PAIN WITHOUT SCIATICA, UNSPECIFIED CHRONICITY: ICD-10-CM

## 2025-05-07 DIAGNOSIS — Z13.29 THYROID DISORDER SCREENING: ICD-10-CM

## 2025-05-07 PROCEDURE — 3008F BODY MASS INDEX DOCD: CPT | Performed by: STUDENT IN AN ORGANIZED HEALTH CARE EDUCATION/TRAINING PROGRAM

## 2025-05-07 PROCEDURE — 99395 PREV VISIT EST AGE 18-39: CPT | Performed by: STUDENT IN AN ORGANIZED HEALTH CARE EDUCATION/TRAINING PROGRAM

## 2025-05-07 PROCEDURE — 1036F TOBACCO NON-USER: CPT | Performed by: STUDENT IN AN ORGANIZED HEALTH CARE EDUCATION/TRAINING PROGRAM

## 2025-05-07 ASSESSMENT — PROMIS GLOBAL HEALTH SCALE
RATE_SOCIAL_SATISFACTION: GOOD
RATE_QUALITY_OF_LIFE: VERY GOOD
RATE_AVERAGE_PAIN: 1
RATE_AVERAGE_FATIGUE: MILD
RATE_PHYSICAL_HEALTH: GOOD
RATE_MENTAL_HEALTH: GOOD
CARRYOUT_SOCIAL_ACTIVITIES: VERY GOOD
CARRYOUT_PHYSICAL_ACTIVITIES: COMPLETELY
RATE_GENERAL_HEALTH: VERY GOOD
EMOTIONAL_PROBLEMS: OFTEN

## 2025-05-07 ASSESSMENT — PATIENT HEALTH QUESTIONNAIRE - PHQ9
1. LITTLE INTEREST OR PLEASURE IN DOING THINGS: NOT AT ALL
2. FEELING DOWN, DEPRESSED OR HOPELESS: NOT AT ALL
SUM OF ALL RESPONSES TO PHQ9 QUESTIONS 1 AND 2: 0

## 2025-05-07 NOTE — PATIENT INSTRUCTIONS
VISIT SUMMARY:  Today, you were seen for hip pain that radiates down your leg, a sensation of heart fluttering, and a review of your headache history. We discussed your symptoms, potential diagnoses, and management options.    YOUR PLAN:  -SACROILIAC SYNDROME OR SCIATICA: This condition involves pain that originates from the sacroiliac joint or the sciatic nerve, causing sharp pain in the hip that can radiate down the leg. We discussed the benefits of physical therapy and stretching exercises, and you were given a handout with specific exercises. If these exercises do not help, we can refer you to physical therapy. An x-ray may be considered if your symptoms persist or worsen to rule out any bone issues.    -GERD WITHOUT ESOPHAGITIS: Gastroesophageal reflux disease (GERD) is a condition where stomach acid frequently flows back into the tube connecting your mouth and stomach. You are not currently experiencing heartburn or pain after eating, and you have stopped taking Protonix. You mentioned a sensation of heart fluttering, but there is no associated pain.    -NONINTRACTABLE EPISODIC HEADACHE, UNSPECIFIED: This refers to occasional headaches that are not severe and do not occur frequently. You have not had any recent headaches since stopping the heartburn medication. Previously, amitriptyline was considered for treatment, but you chose not to take it due to concerns about side effects.    INSTRUCTIONS:  If the exercises for your hip pain do not provide relief, please contact us to arrange a referral for physical therapy. Additionally, if your hip pain persists or worsens, we may consider an x-ray to further investigate the cause.

## 2025-05-07 NOTE — PROGRESS NOTES
"Subjective   Patient ID: Delores Marshall is a 29 y.o. female who presents for Annual Exam (Physical ).  History of Present Illness  Delores Marshall is a 29 year old female who presents with hip pain radiating down her leg.    She experiences sharp pain in her hip that radiates down her leg, described as 'really bad' and occurring frequently with varying intensity. The pain has been present for at least a month, sometimes located on the side of her hip and occasionally traveling down her leg. She manages the pain with chiropractic visits, which provide temporary relief, and uses heating pads and stretching exercises. She does not take Tylenol or ibuprofen for the pain.    She experiences a sensation of heart fluttering but no associated chest pain or pain after eating. She previously took Protonix for heartburn but is no longer experiencing that type of pain. She has not taken any medications recently, including the previously prescribed amitriptyline, due to concerns about side effects.    Her sleep is reported as fine, and her mood is okay. She has not experienced any recent migraines since discontinuing the heartburn medication. Her periods remain regular, though slightly heavier. She experiences occasional constipation but no diarrhea. She engages in regular exercise, primarily walking, and does not experience chest pressure, shortness of breath, or dizziness during activity.    Review of Systems  ROS otherwise negative aside from what was mentioned above in HPI.    Objective     /68 (BP Location: Right arm, Patient Position: Sitting, BP Cuff Size: Adult)   Pulse 68   Ht 1.575 m (5' 2\")   Wt 70.8 kg (156 lb)   SpO2 100%   BMI 28.53 kg/m²      No current outpatient medications      Physical Exam  GENERAL: Alert, cooperative, well developed, no acute distress.  HEENT: Normocephalic, normal oropharynx, moist mucous membranes.  NECK: Thyroid normal to palpation.  CHEST: Clear to auscultation bilaterally, " no wheezes, rhonchi, or crackles.  CV: Normal heart rate and rhythm, S1 and S2 normal without murmurs.  ABDOMEN: Soft, non-tender, non-distended.  SKIN: No rashes or lesions.  NEURO: Cranial nerves grossly intact, moves all extremities without gross motor impairment, normal ROM.    Results      Assessment & Plan  Sacroiliac syndrome or sciatica  Intermittent sharp pain in the hip radiating down the leg, variable in severity, sometimes managed with chiropractic care and stretching. Differential diagnosis includes sacroiliac syndrome or sciatica. Discussed potential benefits of physical therapy and stretching exercises. X-ray can help rule out bone issues and guide further management, though it is not definitive.  - Provide handout on exercises for sacroiliac syndrome or sciatica.  - Offer referral for physical therapy if exercises are not effective.  - Consider x-ray of the low back if symptoms persist or worsen.    GERD without esophagitis  No current symptoms of heartburn or pain after eating. Previously managed with Protonix, which she has stopped taking. Reports a sensation of heart fluttering, but no associated pain.    Nonintractable episodic headache, unspecified  No recent episodes of headaches since discontinuing heartburn medication. Previously considered for treatment with amitriptyline, but she declined due to concerns about side effects.    Follow up annually     Alma Rosa Blum,      This medical note was created with the assistance of artificial intelligence (AI) for documentation purposes. The content has been reviewed and confirmed by the healthcare provider for accuracy and completeness. Patient consented to the use of audio recording and use of AI during their visit.

## 2025-05-08 LAB
ALBUMIN SERPL-MCNC: 4.9 G/DL (ref 3.6–5.1)
ALP SERPL-CCNC: 65 U/L (ref 31–125)
ALT SERPL-CCNC: 15 U/L (ref 6–29)
ANION GAP SERPL CALCULATED.4IONS-SCNC: 12 MMOL/L (CALC) (ref 7–17)
AST SERPL-CCNC: 16 U/L (ref 10–30)
BILIRUB SERPL-MCNC: 0.6 MG/DL (ref 0.2–1.2)
BUN SERPL-MCNC: 13 MG/DL (ref 7–25)
CALCIUM SERPL-MCNC: 9.5 MG/DL (ref 8.6–10.2)
CHLORIDE SERPL-SCNC: 100 MMOL/L (ref 98–110)
CHOLEST SERPL-MCNC: 217 MG/DL
CHOLEST/HDLC SERPL: 3.1 (CALC)
CO2 SERPL-SCNC: 26 MMOL/L (ref 20–32)
CREAT SERPL-MCNC: 0.61 MG/DL (ref 0.5–0.97)
EGFRCR SERPLBLD CKD-EPI 2021: 123 ML/MIN/1.73M2
ERYTHROCYTE [DISTWIDTH] IN BLOOD BY AUTOMATED COUNT: 12.8 % (ref 11–15)
EST. AVERAGE GLUCOSE BLD GHB EST-MCNC: 111 MG/DL
EST. AVERAGE GLUCOSE BLD GHB EST-SCNC: 6.2 MMOL/L
GLUCOSE SERPL-MCNC: 70 MG/DL (ref 65–99)
HBA1C MFR BLD: 5.5 %
HCT VFR BLD AUTO: 41.2 % (ref 35–45)
HDLC SERPL-MCNC: 69 MG/DL
HGB BLD-MCNC: 13.7 G/DL (ref 11.7–15.5)
LDLC SERPL CALC-MCNC: 123 MG/DL (CALC)
MCH RBC QN AUTO: 28.9 PG (ref 27–33)
MCHC RBC AUTO-ENTMCNC: 33.3 G/DL (ref 32–36)
MCV RBC AUTO: 86.9 FL (ref 80–100)
NONHDLC SERPL-MCNC: 148 MG/DL (CALC)
PLATELET # BLD AUTO: 373 THOUSAND/UL (ref 140–400)
PMV BLD REES-ECKER: 8.9 FL (ref 7.5–12.5)
POTASSIUM SERPL-SCNC: 4.2 MMOL/L (ref 3.5–5.3)
PROT SERPL-MCNC: 7.4 G/DL (ref 6.1–8.1)
RBC # BLD AUTO: 4.74 MILLION/UL (ref 3.8–5.1)
SODIUM SERPL-SCNC: 138 MMOL/L (ref 135–146)
TRIGL SERPL-MCNC: 132 MG/DL
TSH SERPL-ACNC: 1.24 MIU/L
WBC # BLD AUTO: 10.1 THOUSAND/UL (ref 3.8–10.8)

## 2025-05-14 ENCOUNTER — PATIENT MESSAGE (OUTPATIENT)
Dept: PRIMARY CARE | Facility: CLINIC | Age: 30
End: 2025-05-14
Payer: COMMERCIAL

## 2025-05-14 DIAGNOSIS — M25.552 PAIN OF LEFT HIP: Primary | ICD-10-CM

## 2025-05-22 ENCOUNTER — HOSPITAL ENCOUNTER (OUTPATIENT)
Dept: RADIOLOGY | Facility: HOSPITAL | Age: 30
Discharge: HOME | End: 2025-05-22
Payer: COMMERCIAL

## 2025-05-22 DIAGNOSIS — M25.552 PAIN OF LEFT HIP: ICD-10-CM

## 2025-05-22 PROCEDURE — 72100 X-RAY EXAM L-S SPINE 2/3 VWS: CPT | Performed by: STUDENT IN AN ORGANIZED HEALTH CARE EDUCATION/TRAINING PROGRAM

## 2025-05-22 PROCEDURE — 73502 X-RAY EXAM HIP UNI 2-3 VIEWS: CPT | Mod: LT

## 2025-05-22 PROCEDURE — 72100 X-RAY EXAM L-S SPINE 2/3 VWS: CPT

## 2025-06-02 ENCOUNTER — APPOINTMENT (OUTPATIENT)
Dept: ORTHOPEDIC SURGERY | Facility: CLINIC | Age: 30
End: 2025-06-02
Payer: COMMERCIAL

## 2025-06-02 VITALS — BODY MASS INDEX: 28.71 KG/M2 | WEIGHT: 156 LBS | HEIGHT: 62 IN

## 2025-06-02 DIAGNOSIS — M25.552 PAIN OF LEFT HIP: ICD-10-CM

## 2025-06-02 PROCEDURE — 99203 OFFICE O/P NEW LOW 30 MIN: CPT | Performed by: ORTHOPAEDIC SURGERY

## 2025-06-02 PROCEDURE — 99213 OFFICE O/P EST LOW 20 MIN: CPT | Performed by: ORTHOPAEDIC SURGERY

## 2025-06-02 PROCEDURE — 3008F BODY MASS INDEX DOCD: CPT | Performed by: ORTHOPAEDIC SURGERY

## 2025-06-02 PROCEDURE — 1036F TOBACCO NON-USER: CPT | Performed by: ORTHOPAEDIC SURGERY

## 2025-06-02 NOTE — PROGRESS NOTES
Subjective    Patient ID: Delores Marshall is a 29 y.o. female.    Chief Complaint: Pain of the Left Hip (FOR 6 MONTHS/[facility])     Last Surgery: No surgery found  Last Surgery Date: No surgery found    HPI  The patient is a 29-year-old female who comes in with a complaint of left hip pain near her gluteal region.  She does not recall a specific trauma.  She states it bothers her about once a week.  Occasionally she will notice pain radiating further distally.  She states she has actually noticed relief when she had her lower back manipulated by chiropractor.  She denies any pain in her groin.    Objective   Ortho Exam  The patient is in no acute distress.  She walks with no evidence of an antalgic gait.  Exam of her left hip reveals hip flexion 90 degrees yields no groin pain.  She does have some limitation with external rotation and placement of the leg in a figure-of-four position.  When doing so causes pain near the greater trochanter.  However she has no tenderness in this region.  She has no tenderness over the IT band as well.  She has no complaints of pain with internal rotation.  She has negative straight leg raise bilaterally.  She has no tenderness along her lumbar spine or to the musculature to the left of the spine.    Image Results:  XR hip left with pelvis when performed 2 or 3 views  Narrative: Interpreted By:  Frederick Lancaster,   STUDY:  XR HIP LEFT WITH PELVIS WHEN PERFORMED 2 OR 3 VIEWS; ;  5/22/2025  3:22 pm      INDICATION:  Signs/Symptoms:left hip pain.      ,M25.552 Pain in left hip      COMPARISON:  None.      ACCESSION NUMBER(S):  [id number]      ORDERING CLINICIAN:  MANI LOVE      FINDINGS:  The pelvic ring is intact without acute fracture or widening of the  pubic symphysis or sacroiliac joints. There is no acute fracture of  the proximal right or left femur or hip dislocation. There is  osteophyte formation at the lateral femoral at junction of the left  femur. To a lesser extent  yes subtle osteophytosis noted at the right  lateral femoral head neck junction. 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 On the left, this is  present both laterally and anteriorly. On the right this is noted  along the lateral femoral head neck junction.      There is degenerative change of the left sacroiliac joint.      Impression: Mild bilateral hip osteoarthrosis (left > right) which may be  secondary to femoroacetabular impingement given the CAM morphology of  the right left proximal femur.      Left sacroiliac joint osteoarthrosis.      MACRO:  None.      Signed by: Frederick Lancaster 5/23/2025 9:19 PM  Dictation workstation:   [id number]  XR lumbar spine 2-3 views  Narrative: Interpreted By:  Frederick Lancaster,   STUDY:  XR LUMBAR SPINE 2-3 VIEWS;  5/22/2025 3:24 pm      INDICATION:  Signs/Symptoms:left hip pain.      ,M25.552 Pain in left hip      COMPARISON:  None.      ACCESSION NUMBER(S):  [id number]      ORDERING CLINICIAN:  MANI LOVE      FINDINGS:  There is levoconvex lumbar scoliosis centered at L1.  No spondylolisthesis.      Vertebral body heights are maintained without evidence for an acute  fracture. No evidence of pars interarticularis defect.      Intervertebral disc spaces are well maintained.          The sacroiliac joints are intact.          Impression: Levoconvex scoliosis of the lumbar spine centered at L1. No  significant degenerative change or acute osseous abnormality.          MACRO:  None.      Signed by: Frederick Lancaster 5/23/2025 9:17 PM  Dictation workstation:   [id number]      Assessment/Plan   Encounter Diagnoses:  Pain of left hip    The patient has pain near her left gluteal region.  She has no pain directly in the groin which is not correlating with the x-ray findings with imaging cam pincer findings.  At this time I would recommend the patient continue with therapy for gluteal stretching and IT band syndrome.  The patient will follow-up  as her symptoms dictate.

## 2025-06-16 ENCOUNTER — APPOINTMENT (OUTPATIENT)
Dept: PRIMARY CARE | Facility: CLINIC | Age: 30
End: 2025-06-16
Payer: COMMERCIAL

## 2025-08-18 ENCOUNTER — TELEPHONE (OUTPATIENT)
Dept: PRIMARY CARE | Facility: CLINIC | Age: 30
End: 2025-08-18
Payer: COMMERCIAL

## 2025-08-18 DIAGNOSIS — Z01.419 ENCOUNTER FOR GYNECOLOGICAL EXAMINATION: Primary | ICD-10-CM

## 2025-10-02 ENCOUNTER — APPOINTMENT (OUTPATIENT)
Dept: OBSTETRICS AND GYNECOLOGY | Facility: CLINIC | Age: 30
End: 2025-10-02
Payer: COMMERCIAL

## 2025-10-02 ENCOUNTER — APPOINTMENT (OUTPATIENT)
Dept: GYNECOLOGIC ONCOLOGY | Facility: CLINIC | Age: 30
End: 2025-10-02
Payer: COMMERCIAL

## 2025-11-11 ENCOUNTER — APPOINTMENT (OUTPATIENT)
Dept: OBSTETRICS AND GYNECOLOGY | Facility: CLINIC | Age: 30
End: 2025-11-11
Payer: COMMERCIAL